# Patient Record
Sex: FEMALE | Race: WHITE | NOT HISPANIC OR LATINO | Employment: UNEMPLOYED | URBAN - METROPOLITAN AREA
[De-identification: names, ages, dates, MRNs, and addresses within clinical notes are randomized per-mention and may not be internally consistent; named-entity substitution may affect disease eponyms.]

---

## 2018-12-29 ENCOUNTER — OFFICE VISIT (OUTPATIENT)
Dept: URGENT CARE | Facility: PHYSICIAN GROUP | Age: 48
End: 2018-12-29

## 2018-12-29 VITALS
HEIGHT: 65 IN | SYSTOLIC BLOOD PRESSURE: 140 MMHG | DIASTOLIC BLOOD PRESSURE: 90 MMHG | RESPIRATION RATE: 15 BRPM | BODY MASS INDEX: 27.99 KG/M2 | WEIGHT: 168 LBS | HEART RATE: 79 BPM | TEMPERATURE: 98.6 F | OXYGEN SATURATION: 99 %

## 2018-12-29 DIAGNOSIS — L03.211 FACIAL CELLULITIS: ICD-10-CM

## 2018-12-29 DIAGNOSIS — J01.00 ACUTE MAXILLARY SINUSITIS, RECURRENCE NOT SPECIFIED: ICD-10-CM

## 2018-12-29 PROCEDURE — 99204 OFFICE O/P NEW MOD 45 MIN: CPT | Performed by: PHYSICIAN ASSISTANT

## 2018-12-29 RX ORDER — CLINDAMYCIN HYDROCHLORIDE 300 MG/1
300 CAPSULE ORAL 4 TIMES DAILY
Qty: 40 CAP | Refills: 0 | Status: SHIPPED | OUTPATIENT
Start: 2018-12-29

## 2018-12-29 ASSESSMENT — ENCOUNTER SYMPTOMS
SPUTUM PRODUCTION: 0
VOMITING: 0
TINGLING: 0
SINUS PAIN: 1
WHEEZING: 0
ABDOMINAL PAIN: 0
SWOLLEN GLANDS: 1
SINUS PRESSURE: 1
SENSORY CHANGE: 0
MYALGIAS: 0
FEVER: 0
NAUSEA: 0
HOARSE VOICE: 0
COUGH: 0
DIARRHEA: 0
SORE THROAT: 0
FOCAL WEAKNESS: 0
PALPITATIONS: 0
HEADACHES: 1
CHILLS: 0
SHORTNESS OF BREATH: 0

## 2018-12-29 NOTE — PROGRESS NOTES
"Subjective:      Mellisa Ragsdale is a 48 y.o. female who presents with Sinusitis (x 3 days )            Sinusitis   This is a new problem. The current episode started in the past 7 days. The problem has been rapidly worsening (left facial swelling ) since onset. There has been no fever. The pain is moderate. Associated symptoms include congestion, headaches, sinus pressure and swollen glands. Pertinent negatives include no chills, coughing, ear pain, hoarse voice, shortness of breath, sneezing or sore throat. Past treatments include oral decongestants and saline sprays (Flonase ). The treatment provided no relief.     The patient has history of recurrent sinusitis. She also usually with get facial swelling associated with her sinus infections.  Left cheek swelling started yesterday. 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       Family History   Problem Relation Age of Onset   • Heart Disease Mother         CHF   • Blood Disease Mother         anemia   • Hypertension Mother    • Hyperlipidemia Mother    • Heart Disease Father         CAD, s/MI, CHF   • Psychiatry Sister         psych d/o   • GI Sister         Celiac disease   • Blood Disease Sister    " "anemia       Allergies   Allergen Reactions   • Other Food      peanuts   • Pcn [Penicillins] Rash   • Pristiq [Desvenlafaxine Succinate Monohydrate]      hives       Medications, Allergies, and current problem list reviewed today in Epic    Review of Systems   Constitutional: Negative for chills, fever and malaise/fatigue.   HENT: Positive for congestion, sinus pain and sinus pressure. Negative for ear discharge, ear pain, hoarse voice, sneezing and sore throat.    Respiratory: Negative for cough, sputum production, shortness of breath and wheezing.    Cardiovascular: Negative for chest pain, palpitations and leg swelling.   Gastrointestinal: Negative for abdominal pain, diarrhea, nausea and vomiting.   Musculoskeletal: Negative for myalgias.   Skin: Negative for rash.   Neurological: Positive for headaches. Negative for tingling, sensory change and focal weakness.     All other systems reviewed and are negative.        Objective:     /90   Pulse 79   Temp 37 °C (98.6 °F) (Temporal)   Resp 15   Ht 1.651 m (5' 5\")   Wt 76.2 kg (168 lb)   SpO2 99%   BMI 27.96 kg/m²      Physical Exam   Constitutional: She is oriented to person, place, and time. She appears well-developed and well-nourished. No distress.   HENT:   Head: Normocephalic and atraumatic.       Right Ear: Tympanic membrane, external ear and ear canal normal.   Left Ear: Tympanic membrane, external ear and ear canal normal.   Nose: Mucosal edema and rhinorrhea present. Right sinus exhibits no maxillary sinus tenderness. Left sinus exhibits maxillary sinus tenderness.   Mouth/Throat: Uvula is midline, oropharynx is clear and moist and mucous membranes are normal. No tonsillar exudate.   Eyes: Conjunctivae are normal.   Neck: Neck supple.   Cardiovascular: Normal rate, regular rhythm and normal heart sounds.  Exam reveals no gallop and no friction rub.    No murmur heard.  Pulmonary/Chest: Effort normal and breath sounds normal. No respiratory " distress. She has no wheezes. She has no rales.   Lymphadenopathy:     She has no cervical adenopathy.   Neurological: She is alert and oriented to person, place, and time. No cranial nerve deficit.   Skin: Skin is warm and dry. No rash noted.   Psychiatric: She has a normal mood and affect. Her behavior is normal. Judgment and thought content normal.               Assessment/Plan:     1. Acute maxillary sinusitis, recurrence not specified  clindamycin (CLEOCIN) 300 MG Cap   2. Facial cellulitis  clindamycin (CLEOCIN) 300 MG Cap         Current Outpatient Prescriptions:   •  clindamycin (CLEOCIN) 300 MG Cap, Take 1 Cap by mouth 4 times a day., Disp: 40 Cap, Rfl: 0  -Encouraged probiotics with antibiotic.   - Side effects and benefits of medication reviewed. Explained risk for C. Diff. Advised evaluation if any diarrhea.      Differential diagnoses, Supportive care, and indications for immediate follow-up discussed with patient.   Instructed to return to clinic or nearest emergency department for any change in condition, further concerns, or worsening of symptoms.    The patient demonstrated a good understanding and agreed with the treatment plan.    Mayra Moore P.A.-C.